# Patient Record
(demographics unavailable — no encounter records)

---

## 2017-01-20 NOTE — ERRECORD
[facility]

                                EMERGENCY RECORD



HPI TOOTHACHE (14:42 RWAG)

CHIEF COMPLAINT: Patient presents for evaluation of

      toothache. HISTORIAN: History provided by patient.

      LOCATION: Symptoms are localized, most severe to

      "whole mouth" Widespread dental decay. QUALITY:

      Described as similar to previous episodes. SEVERITY:

      Maximum severity of symptoms mild, Currently

      symptoms are mild. TIME COURSE: Patient unable to

      describe onset of symptoms, There has been no change in the

      patient's symptoms over time. ASSOCIATED WITH: No

      associated symptoms. EXACERBATED BY: Patient's condition

      exacerbated by nothing. RELIEVED BY: Nothing tried for

      relief.



ROS (14:43 RW)

CONSTITUTIONAL: Negative constitutional review of systems.

EYES: Negative eye review of systems.

ENT: Negative ears, nose, throat review of systems.

CARDIOVASCULAR: Negative cardiovascular review of systems.

RESPIRATORY: Negative respiratory review of systems.

GI: Negative gastrointestinal review of systems.

GENITOURINARY FEMALE: Negative genitourinary review of systems.

MUSCULOSKELETAL: Negative musculoskeletal review of systems.

SKIN: Negative skin review of systems.

NEUROLOGIC: Negative neurologic review of systems.

ENDOCRINE: Negative endocrine review of systems.

HEMO/LYMPHATIC: Normal hematologic/lymphatic system review.

ALLERGIC/IMMUNOLOGIC: Normal allergy/immunologic system review.

PSYCHIATRIC: Negative psychiatric review of systems.

NOTES: All systems reviewed, negative except as described above.



PAST MEDICAL HISTORY (14:15 [facility])

MEDICAL HISTORY: Flu vaccine not up to date, Tetanus

      immunization up to date, Pneumococcal vaccine not up to

      date, Past medical history includes history of

      hypertension, which has been treated, Patient is compliant,

            Past medical history includes history of hyperlipidemia, high

      cholesterol, Past medical history includes history of hypertension,

      which has been treated.

FEMALE SURGICAL HISTORY:  Surgical history of appendectomy,

      Surgical history of hernia repair, Surgical history of tubal

      ligation, LEFT ANKLE (MAY 2016), BLIAT TUBAL LIGATION, Surgical

      history of appendectomy, Surgical history of carpal tunnel surgery,

      Surgical history of hernia repair.

PSYCHIATRIC HISTORY: Psychiatric history includes,

      bipolar disorder, depression, BIPOLAR.

SOCIAL HISTORY:  Patient denies alcohol use, Patient is a

      former drug user, abused CRACK cocaine, Patient has no smoking

      history, Lives at home, with family, Patient denies alcohol use,

      Patient is a former drug user.



&a-1R&a+25V*p+0X*a6714L*c202B*c15G*c2P*p-0X&a-25V&a+1R          Name: Sofia Fuller  : 1958 F59 MedRec: [id number]

                             AcctNum: [id number]

  Prepared:  16:17 by Interface                 Page 1 of 3

                                      pMD

                        [facility]

                                EMERGENCY RECORD





KNOWN ALLERGIES

No Known Allergies (Unconfirmed)

No Known Drug Allergies



CURRENT MEDICATIONS

doxepin: 

      CAPSULE : Strength - 75 mg : ORAL

      Patient Dose: 75 mg Oral once a day. (14:11 CJ)

RisperDAL: 

      TABLET : Strength - 2 mg : ORAL

      Patient Dose: 5 mg Oral once a day. (14:11 CJ)

CeleXA: 

      TABLET : Strength - 20 mg : ORAL

      Patient Dose: Unknown. (14:12 CJEF)

lisinopril: 

      TABLET : Strength - 2.5 mg : ORAL

      Patient Dose: UNK mg Oral once a day. (14:12 CJ)



VITAL SIGNS (14:07 CJ)

VITAL SIGNS: BP: 164/90, Pulse: 118, Resp: 18, Temp: 99 (Oral),

      Pain: 10, O2 sat: 96 on Room Air, Time: 2017 14:07.



PHYSICAL EXAM (14:43 RW)

CONSTITUTIONAL: Vital signs reviewed.

HEAD: Head exam normal.

EYES: Eye exam normal.

ENT: Ear exam normal, Nose exam normal, Pharynx exam normal,

      Uvula exam normal, Tonsil exam normal, Teeth with,

      dental caries.

NECK: Neck exam normal.

RESPIRATORY CHEST: Respiratory and chest exam normal.

CARDIOVASCULAR: Cardiovascular assessment normal.

ABDOMEN FEMALE: Abdominal exam normal.

BACK: Back exam normal.

UPPER EXTREMITY: Upper extremity exam normal.

LOWER EXTREMITY: Lower extremity exam normal.

NEURO: Neuro exam normal.

SKIN: Skin exam normal.

LYMPHATIC: Lymphatic exam normal.

PSYCHIATRIC: Psychiatric exam normal.



MEDICATION ADMINISTRATION SUMMARY



      Drug Name: Norco, Dose Ordered: 1 tab(s), Route: Oral, Status: Held,

      Time: 14:39 2017, Detailed record available in Medication

      Service section.



PROBLEM LIST

  No recorded problems



&a-1R&a+25V*p+0X*w8074G*c202B*c15G*c2P*p-0X&a-25V&a+1R          Name: Sofia Fuller  : 1958 F59 MedRec: [id number]

                             AcctNum: [id number]

  Prepared:  16:17 by Interface                 Page 2 of 3

                                      pMD

                        [facility]

                                EMERGENCY RECORD





DIAGNOSIS (14:29 RW)

FINAL: PRIMARY: Dental caries.



PRESCRIPTION (14:28 RW)

Cipro tablet:  TABLET : 500 mg : ORAL : Quantity: *** 1 *** Unit:

      tab(s) Route: ORAL Schedule: every 12 hours Dispense: *** 14 ***

      Unit: tab(s)

      May substitute. Refills: *** No Refills ***.

NOTES:  No Refills.

Ultram:  TABLET : 50 mg : ORAL : Quantity: *** 2 *** Unit: tab(s)

      Route: ORAL Schedule: every 6 hours PRN Dispense: *** 20 *** Unit:

      tab(s)

      May substitute. Refills: *** No Refills ***

      POTENTIAL SEVERE INTERACTION: doxepin oral

      Override Rationale: Benefits outweigh risks, No alternative

      available, Patient tolerated similar in past.

NOTES:  No Refills.



DISPOSITION

PATIENT:  Disposition Type: Discharge, Disposition: *Discharge

      Home, Disposition Transport: Car, Condition: Improved. (14:29 [facility])

   Patient left the department. (14:41 [facility])

Key:

  CHIDI=ELY Mccann, Ida  RWAG=MD Manny, Mike





















































&a-1R&a+25V*p+0X*a5462S*c202B*c15G*c2P*p-0X&a-25V&a+1R          Name: Sofia Fuller  : 1958 F59 MedRec: [id number]

                             AcctNum: [id number]

  Prepared:  16:17 by Interface                 Page 3 of 3

                                      pMD

MTDD

## 2017-01-20 NOTE — PICIS
[facility]

                                EMERGENCY RECORD



TRIAGE ( 14:11 CJEF)

TRIAGE NOTES:  PT REPORTS ENTIRE TEETH PAIN, UPPER AND

      LOWER. PT WITH MULTIPLE AVULSED AND BROKEN TEETH THROUGH ENTIRE

      MOUTH. PT REPORTS THAT SHE HAS BEEN WITH PAIN FOR X3 DAYS AND HAS

      BEEN TRYING TO FIND A DENTIST WHO TAKES HER INSURANCE. PT REPORTS

      THAT THE PAIN ALSO EXTENDS TO THE LEFT CHEEK. PT REPORTS THAT SHE HAS

      TAKEN TRAZADONE, TYLENOL 3,AND TRAMADOL WITH NO RELIEF TO THE PAIN.

      PT WITH PHMX OF "CRACK' USE. ( 14:11 CJEF)

PATIENT: NAME: Sofia Fuller, AGE: 59, GENDER: female, :

      Sun 1958, TIME OF GREET:  14:00, PREFERRED

      LANGUAGE: English, ETHNICITY: Not  or , FALL RISK: YES,

      ECODE BILLING MAP: [facility], SSN: [id number], Zip Code:

      [address], KG WEIGHT: 68.04, PHONE: [phone], MEDICAL RECORD NUMBER:

      [id number], ACCOUNT NUMBER: [id number], PERSON ID: [id number], PCP:

      MD Gurrola Olayemi. ( 14:11 CJEF)

COMPLAINT:  TOOTH PAIN. ( 14:11 CJEF)

ADMISSION: URGENCY: 4 Non Urgent, ADMISSION SOURCE: Home,

      TRANSPORT: Walk-in, BED: TRIAGE. ( 14:11 CJEF)

ASSESSMENT: Assessment: TEETH PAIN. (14:15 CJEF)

PAIN: Patient complains of pain described as, Location

      TEETH. (14:15 CJEF)

IMMUNIZATIONS: Flu vaccine not up to date, Tetanus

      immunization up to date, Pneumococcal vaccine not up to

      date. (14:15 CJEF)

SIRS SCORING: Heart Rate 110-139 (2), Temp range

      96.8-101.1 (0), respiratory rate 12-24 (0), Mental Status altered: no

      (0), Total SIRS Score 2, Yes, Infection or Suspected

      Infection. (14:15 CJEF)

SIRS NOTIFICATION: Yes, Infection or Suspected

      Infection. (14:15 CJEF)

TRIAGE SCREENING: Patient denies suicidal ideation, Patient

      denies presence of domestic violence. (14:15 CJEF)

LMP: LMP: Menopause. (14:15 CJEF)

PROVIDERS: TRIAGE NURSE: Ida Mccann RN. (

      14:11 CJEF)

VITAL SIGNS: /90, Pulse 118, Resp 18, Temp 99, (Oral), Pain

      10, O2 Sat 96, on Room Air, Time 2017 14:07. (14:07 CJEF)

PREVIOUS VISIT ALLERGIES: No Known Drug Allergies. ( 14:11 CJEF)

  No Known Drug Allergies. (14:15 CJEF)



KNOWN ALLERGIES

No Known Allergies (Unconfirmed)

No Known Drug Allergies



CURRENT MEDICATIONS

doxepin: 

      CAPSULE : Strength - 75 mg : ORAL

      Patient Dose: 75 mg Oral once a day. (14:11 CJEF)

RisperDAL: 



&a-1R&a+25V*p+0X*q8835U*c202B*c15G*c2P*p-0X&a-25V&a+1R          Name: Sofia Fuller MAISHA  : 1958 F59 MedRec: [id number]

                             AcctNum: [id number]

  Prepared:  16:23 by Interface                 Page 1 of 5

                                      pMD

                        [facility]

                                EMERGENCY RECORD



      TABLET : Strength - 2 mg : ORAL

      Patient Dose: 5 mg Oral once a day. (14:11 CJEF)

CeleXA: 

      TABLET : Strength - 20 mg : ORAL

      Patient Dose: Unknown. (14:12 CJEF)

lisinopril: 

      TABLET : Strength - 2.5 mg : ORAL

      Patient Dose: UNK mg Oral once a day. (14:12 CJEF)



VITAL SIGNS (14:07 CJEF)

VITAL SIGNS: BP: 164/90, Pulse: 118, Resp: 18, Temp: 99 (Oral),

      Pain: 10, O2 sat: 96 on Room Air, Time: 2017 14:07.



NURSING ASSESSMENT: DENTAL (14:16 CJEF)

CONSTITUTIONAL: Complex assessment performed, Patient arrives

      ambulatory, Gait steady, History obtained from patient, Patient

      appears, uncomfortable, Patient cooperative, Patient alert,

      Oriented to person, place and time, Skin warm, Skin dry, Skin normal

      in color, Mucous membranes pink, Mucous membranes moist,

      Patient, poorly groomed, PT REPORTS ENTIRE

      TEETH PAIN, UPPER AND LOWER. PT WITH MULTIPLE AVULSED AND BROKEN

      TEETH THROUGH ENTIRE MOUTH. PT REPORTS THAT SHE HAS BEEN WITH PAIN

      FOR X3 DAYS AND HAS BEEN TRYING TO FIND A DENTIST WHO TAKES HER

      INSURANCE. PT REPORTS THAT THE PAIN ALSO EXTENDS TO THE LEFT CHEEK.

      PT REPORTS THAT SHE HAS TAKEN TRAZADONE, TYLENOL 3,AND TRAMADOL WITH

      NO RELIEF TO THE PAIN. PT WITH PHMX OF "CRACK' USE.

PAIN: aching pain, to left upper back tooth

      (teeth), to right upper back tooth (teeth), to

      upper teeth, to left lower back tooth (teeth), to

      right lower back tooth (teeth), to lower teeth,

      to the left upper jaw, to the right upper jaw,

      to the left lower jaw, right lower jaw,

      constant, on a scale 0-10 patient rates pain as

      10, ALL OVER TEETH PAIN.

DENTAL: Dental assessment findings include mouth with,

      poor dental hygiene, bad breath (halitosis),

      Teeth abnormal:, avulsed primary tooth (teeth),

      avulsed secondary tooth (teeth), broken primary tooth

      (teeth), broken secondary tooth (teeth), loose

      primary tooth (teeth), loose secondary tooth (teeth),

      missing primary tooth (teeth), missing secondary tooth

      (teeth), gums tender.

NOTES: Patient tolerated procedure well.

SAFETY: Side rails up, Cart/Stretcher in lowest position, Family

      at bedside, Call light within reach, Hospital ID band on.



NURSING PROCEDURE: DISCHARGE NOTE (14:40 [facility])

DISCHARGE: Patient discharged to home, ambulating without

      assistance, driving self, unaccompanied, Summary of Care printed/

      provided, Patient requested and was provided an electronic copy of



&a-1R&a+25V*p+0X*d8931S*c202B*c15G*c2P*p-0X&a-25V&a+1R          Name: Sofia Fuller  : 1958 F59 MedRec: [id number]

                             AcctNum: [id number]

  Prepared:  16:23 by Interface                 Page 2 of 5

                                      pMD

                        [facility]

                                EMERGENCY RECORD



      Discharge Instructions, Transition record given to patient, Discharge

      instructions given to patient, Simple or moderate discharge teaching

      performed, Prescriptions given and instructions on side effects

      given, Medication reconciliation form given, Above person(s)

      verbalized understanding of discharge instructions and follow-up

      care, Patient treated and evaluated by physician.

BELONGINGS: Belongings remain with patient.

NOTES: Patient tolerated procedure well.

SAFETY: Side rails up, Cart/Stretcher in lowest position, Family

      at bedside, Call light within reach, Hospital ID band on.



MEDICATION ADMINISTRATION SUMMARY



      Drug Name: Norco, Dose Ordered: 1 tab(s), Route: Oral, Status: Held,

      Time: 14:39 2017, Detailed record available in Medication

      Service section.



MEDICATION SERVICE (14:26 RW)

Norco:  Order: Norco (hydrocodone bitartrate/acetaminophen) -

      Dose: 1 tab(s) : Oral

      Schedule: Now

      Ordered by: Mike Bryant MD

      Entered by: Mike Bryant MD  14:26 ,

      Held by: Ida Mccann RN  14:39 Reason: Patient

      refused:NO RIDE HOME.



HPI TOOTHACHE (14:42 RWAG)

CHIEF COMPLAINT: Patient presents for evaluation of

      toothache. HISTORIAN: History provided by patient.

      LOCATION: Symptoms are localized, most severe to

      "whole mouth" Widespread dental decay. QUALITY:

      Described as similar to previous episodes. SEVERITY:

      Maximum severity of symptoms mild, Currently

      symptoms are mild. TIME COURSE: Patient unable to

      describe onset of symptoms, There has been no change in the

      patient's symptoms over time. ASSOCIATED WITH: No

      associated symptoms. EXACERBATED BY: Patient's condition

      exacerbated by nothing. RELIEVED BY: Nothing tried for

      relief.



ROS (14:43 RW)

CONSTITUTIONAL: Negative constitutional review of systems.

EYES: Negative eye review of systems.

ENT: Negative ears, nose, throat review of systems.

CARDIOVASCULAR: Negative cardiovascular review of systems.

RESPIRATORY: Negative respiratory review of systems.

GI: Negative gastrointestinal review of systems.

GENITOURINARY FEMALE: Negative genitourinary review of systems.

MUSCULOSKELETAL: Negative musculoskeletal review of systems.

SKIN: Negative skin review of systems.



&a-1R&a+25V*p+0X*k9020L*c202B*c15G*c2P*p-0X&a-25V&a+1R          Name: Sofia Fuller  : 1958 F59 MedRec: [id number]

                             AcctNum: [id number]

  Prepared:  16:23 by Interface                 Page 3 of 5

                                      pMD

                        [facility]

                                EMERGENCY RECORD



NEUROLOGIC: Negative neurologic review of systems.

ENDOCRINE: Negative endocrine review of systems.

HEMO/LYMPHATIC: Normal hematologic/lymphatic system review.

ALLERGIC/IMMUNOLOGIC: Normal allergy/immunologic system review.

PSYCHIATRIC: Negative psychiatric review of systems.

NOTES: All systems reviewed, negative except as described above.



PAST MEDICAL HISTORY (14:15 [facility])

MEDICAL HISTORY: Flu vaccine not up to date, Tetanus

      immunization up to date, Pneumococcal vaccine not up to

      date, Past medical history includes history of

      hypertension, which has been treated, Patient is compliant,

            Past medical history includes history of hyperlipidemia, high

      cholesterol, Past medical history includes history of hypertension,

      which has been treated.

FEMALE SURGICAL HISTORY:  Surgical history of appendectomy,

      Surgical history of hernia repair, Surgical history of tubal

      ligation, LEFT ANKLE (MAY 2016), BLIAT TUBAL LIGATION, Surgical

      history of appendectomy, Surgical history of carpal tunnel surgery,

      Surgical history of hernia repair.

PSYCHIATRIC HISTORY: Psychiatric history includes,

      bipolar disorder, depression, BIPOLAR.

SOCIAL HISTORY:  Patient denies alcohol use, Patient is a

      former drug user, abused CRACK cocaine, Patient has no smoking

      history, Lives at home, with family, Patient denies alcohol use,

      Patient is a former drug user.



PHYSICAL EXAM (14:43 [facility])

CONSTITUTIONAL: Vital signs reviewed.

HEAD: Head exam normal.

EYES: Eye exam normal.

ENT: Ear exam normal, Nose exam normal, Pharynx exam normal,

      Uvula exam normal, Tonsil exam normal, Teeth with,

      dental caries.

NECK: Neck exam normal.

RESPIRATORY CHEST: Respiratory and chest exam normal.

CARDIOVASCULAR: Cardiovascular assessment normal.

ABDOMEN FEMALE: Abdominal exam normal.

BACK: Back exam normal.

UPPER EXTREMITY: Upper extremity exam normal.

LOWER EXTREMITY: Lower extremity exam normal.

NEURO: Neuro exam normal.

SKIN: Skin exam normal.

LYMPHATIC: Lymphatic exam normal.

PSYCHIATRIC: Psychiatric exam normal.



EVENTS

TRANSFER:  Triage to Emergency Triage. ( 14:11

      [facility])

   Emergency Triage to Main ED -03. (14:15 [facility])



&a-1R&a+25V*p+0X*l1696V*c202B*c15G*c2P*p-0X&a-25V&a+1R          Name: Sofia Fuller  : 1958 F59 MedRec: [id number]

                             AcctNum: [id number]

  Prepared:  16:23 by Interface                 Page 4 of 5

                                      pMD

                        [facility]

                                EMERGENCY RECORD



   Removed from Emergency Main ED -03. (14:41 CJ)



PROBLEM LIST

  No recorded problems



DIAGNOSIS (14:29 RWAG)

FINAL: PRIMARY: Dental caries.



DISPOSITION

PATIENT:  Disposition Type: Discharge, Disposition: *Discharge

      Home, Disposition Transport: Car, Condition: Improved. (14:29 RWAG)

   Patient left the department. (14:41 CJ)



INSTRUCTION (14:29 RWAG)

DISCHARGE:  DENTAL PAIN.

FOLLOWUP:  MD Izabela, Cassandra, [facility], [address] [place] [address], [phone], Follow up with

      Primary Care Physician in 5 days.

SPECIAL:  Follow-up with your PCP.



PRESCRIPTION (14:28 RWAG)

Cipro tablet:  TABLET : 500 mg : ORAL : Quantity: *** 1 *** Unit:

      tab(s) Route: ORAL Schedule: every 12 hours Dispense: *** 14 ***

      Unit: tab(s)

      May substitute. Refills: *** No Refills ***.

NOTES:  No Refills.

Ultram:  TABLET : 50 mg : ORAL : Quantity: *** 2 *** Unit: tab(s)

      Route: ORAL Schedule: every 6 hours PRN Dispense: *** 20 *** Unit:

      tab(s)

      May substitute. Refills: *** No Refills ***

      POTENTIAL SEVERE INTERACTION: doxepin oral

      Override Rationale: Benefits outweigh risks, No alternative

      available, Patient tolerated similar in past.

NOTES:  No Refills.



IMAGING (14:42 [facility])

*DISCHARGE INSTRUCTIONS RECEIPT:  Image captured from scanner.

*SUPPLY CHARGE SHEET:  Image captured from scanner.



ADMIN (16:14 RW)

DIGITAL SIGNATURE:  MD Manny, Mike.

Tan:

  [facility]=ELY Mccann, Ida  RWAG=MD Manny, Mike

















&a-1R&a+25V*p+0X*t7476D*c202B*c15G*c2P*p-0X&a-25V&a+1R          Name: Sofia Fuller  : 1958 F59 MedRec: [id number]

                             AcctNum: [id number]

  Prepared:  16:23 by Interface                 Page 5 of 5

                                      pMD





                     [facility]

                        MEDICATION RECONCILIATION



    You were seen in the Emergency Department on: 

    

    KNOWN ALLERGIES

         No Known Allergies (Unconfirmed)

         No Known Drug Allergies

    

    HOME MEDICATIONS 

    

      CONTINUE AS PRESCRIBED

      CeleXA : TABLET : Strength - 20 mg : ORAL

        Continue as prescribed

        Patient had been taking: Dose unknown

    

      doxepin : CAPSULE : Strength - 75 mg : ORAL

        Continue as prescribed

        Patient had been takin mg Oral once a day.

    

      lisinopril : TABLET : Strength - 2.5 mg : ORAL

        Continue as prescribed

        Patient had been taking: UNK mg Oral once a day.

    

      RisperDAL : TABLET : Strength - 2 mg : ORAL

        Continue as prescribed

        Patient had been takin mg Oral once a day.

    

    Notes from the emergency department

      Reviewed with patient

    

    PRESCRIPTIONS (2)

    

    Printed (2)

    

      Cipro tablet : TABLET : 500 mg : ORAL

          Quantity: 1, Unit: tab(s), Route: ORAL, Schedule: every 12

          hours, Dispense: 14 Unit: tab(s)

    























&a-1R&a+25V*p+0X*d9003T*c202B*c15G*c2P*p-0X&a-25V&a+1R       Name: Sofia Fuller  : 1958 F59 MedRec: [id number]

                          AcctNum: [id number]

               Prepared:  16:23 by Interface

                                   pMD

SAV

## 2017-06-21 NOTE — CT
CT OF THE LEFT HINDFOOT

6/21/17

 

CLINICAL HISTORY: 

Prior fracture with subsequent fixation. 

 

FINDINGS:  

There is plate and screw fixation with laterally located metallic plate and several metallic screws 
traversing the anterior and posterior aspect of the calcaneus. Multifocal degenerative cyst formatio
n is present involving the postoperative calcaneus, as well as in the hindfoot and midfoot. Focal re
lative demineralization involving the lateral aspect of the talar dome is present without associated
 subchondral collapse. Lisfranc joint is maintained. Scattered soft tissue edema. There is a heterot
opic density adjacent the cuboid, well circumscribed, and chronic in appearance.

 

IMPRESSION:  

Postoperative left hindfoot. The fixated, healed fracture demonstrates near anatomic alignment.

 

There is multifocal degenerative cyst formation. No obvious hardware complication is seen within reyes
itations, as there is prominent streak artifact. 

 

POS: [facility]

## 2017-10-05 NOTE — RAD
LEFT FOOT RADIOGRAPHS THREE VIEWS:

10/5/17

 

PROVIDED CLINICAL HISTORY:  

Postop.

 

FINDINGS:  

Comparison 7/21/16.

 

Interval removal of the plate and screws previously seen. Interval placement of two large partially 
threaded cannulated lag screws traversing the calcaneus and subtalar joint regions. There is diffuse
 regional osteopenia. There is no evidence for an acute fracture or other acute osseous abnormality.
 

 

IMPRESSION:  

Interval postsurgical changes as above. 

 

POS: OFF

## 2017-11-30 NOTE — RAD
3 VIEWS LEFT FOOT:

 

Date:  11/30/17 

 

INDICATION:

Left foot pain. 

 

FINDINGS:

The two separate lag screws fixating the patient's healed calcaneal fracture is unchanged. Scattered 
osteoarthritic change is similar appearing. No acute osseous abnormality is evident. 

 

IMPRESSION: 

Stable postoperative left foot. No acute osseous abnormality. 

 

 

POS: [facility]

## 2019-03-08 NOTE — RAD
CHEST ONE VIEW

3/8/19

 

INDICATION:

Syncope. 

 

COMPARISON:  

None.

 

FINDINGS:  

The lungs are clear. Heart size is normal. No acute osseous abnormality is noted. 

 

IMPRESSION:  

No acute cardiopulmonary abnormality. 

 

POS: [facility]

## 2019-03-08 NOTE — CT
CT BRAIN NONCONTRAST:

3/8/19

 

HISTORY: 

61-year-old female status post syncope.

 

FINDINGS:

There is no midline shift or any other mass effect.  There is no evidence of acute intracranial hemor
rhage, large cortical infarct, obstructive hydrocephalus, or extraaxial fluid collection.  The calvar
ium is intact.

 

IMPRESSION: 

No acute intracranial findings.

 

 

jn []

 

POS: [facility]

## 2019-03-09 NOTE — HP
PRIMARY CARE PHYSICIAN:  Dr. Georgiana Gurrola.



CHIEF COMPLAINT:  Syncopal episode, hypotension, QT prolongation.



HISTORY OF PRESENT ILLNESS:  Ms. Fuller is a 61-year-old female with past medical

history of hypertension, hyperlipidemia, gastroesophageal reflux disease, and

cocaine use along with bipolar disorder and depression, presented to the Medicine ER

late last night after a syncopal episode at home.  For the day prior to arrival, the

patient had stated that she had several bouts of diarrhea about 4-5 the day prior

and the day of which was yesterday.  She reports walking on the hallway to her

bathroom and feeling dizzy and falling to the ground, she states that she had helped

herself up and got to the bathroom when she had noticed another episode.  She was

brought in by EMS for further evaluation, she had received 500 mL fluid bolus as her

systolic blood pressure was in the 80s.  Upon arrival, CT of head was obtained and

found to be negative with no acute findings, chest x-ray was also performed and

showed no acute cardiopulmonary abnormality.  Potassium level was found to be low at

3.0.  Therefore, she was given potassium replacement.  On her EKG, it had displayed

prolonged QT.  She had received another 2 L of IV fluid with normal saline and then

transferred to [facility] for further evaluation and

management of her symptoms.  Upon arrival, she had no fever or chills.  She had no

complaints of headache, dizziness, or blurred vision.  She had no chest pain,

shortness of breath, or palpitations.  She had some mild nausea, however, this has

improved after a dose of Reglan.  She was given 2 g of IV magnesium sulfate and

started on 1 L of half-normal saline with D5 and potassium chloride.  Looking at her

home medications, she had been taking Risperdal, Celexa, doxepin, which all could be

causing the QT prolongation.  However, a drug screen was also obtained and found to

be positive for cocaine, the patient also admitted to smoking cocaine for the last

couple of days prior to symptoms starting.  It was determined the patient be

admitted under observation and be monitored on telemetry for further QT

prolongation. 



REVIEW OF SYSTEMS:  All other systems reviewed and found to be negative unless

mentioned in the HPI. 



PAST MEDICAL HISTORY:  Hypertension, hyperlipidemia, gastroesophageal reflux disease.



PAST SURGICAL HISTORY:  Appendectomy, hernia repair, tubal ligation, left ankle

surgery, carpal tunnel surgery. 



PSYCHIATRIC HISTORY:  Significant for bipolar and depression.



SOCIAL HISTORY:  The patient admits to alcohol use roughly once a week, however,

denies any tobacco use.  She also reports using crack cocaine.  Drug screen positive

for cocaine use. 



KNOWN ALLERGIES:  Latex.



CURRENT HOME MEDICATIONS:  

1. Doxepin 75 mg oral once daily.

2. Risperdal 5 mg oral once daily.

3. Celexa 20 mg oral once daily.

4. Lisinopril 10 mg oral once daily.



PHYSICAL EXAMINATION:

VITAL SIGNS:  Blood pressure 140/79, pulse 79, respirations 20, temp 98.2 degrees

Fahrenheit, O2 saturation 97% on room air. 

GENERAL:  The patient is awake, alert, and oriented x3.  No acute distress noted. 

HEENT:  Atraumatic, normocephalic.  Pupils are round and reactive to light.

Extraocular muscles intact.  Moist mucous membranes noted with poor oral hygiene. 

NECK:  Soft and supple.  No JVD. 

CARDIOVASCULAR:  Positive S1 and S2.  Regular rate and rhythm.  No murmur

auscultated. 

RESPIRATORY:  Clear to auscultation bilaterally.  No wheezes, rales, or rhonchi. 

ABDOMEN:  Soft, nontender.  Bowel sounds present.  Nondistended. 

MUSCULOSKELETAL:  Strength 5+ bilaterally upper and lower extremities.  Moves all

extremities equally.  No edema noted. 

NEUROLOGIC:  Cranial nerves 2 through 12 grossly intact.  No focal deficits noted.

Speech intact and normal.  Gait not assessed. 

PSYCHIATRIC:  Good mood and affect.



LABORATORY DATA:  WBC 7.3, RBC 3.80, hemoglobin 12.5, platelets 270.  Sodium 142,

potassium 3.8, chloride 113, carbon dioxide 22, anion gap 11, BUN 4, creatinine

0.85, estimated GFR 82.  Troponin less than 0.010.  Urinalysis was unremarkable.

Toxicology detected tricyclics and cocaine.  Plasma alcohol less than 10. 



DIAGNOSTIC IMAGING:  CT brain noncontrast showed no acute intracranial findings.

Chest x-ray showed no acute cardiopulmonary abnormality. 



ASSESSMENT AND PLAN:  

1. QT prolongation, this is likely secondary to cocaine use and/or doxepin and/or

Celexa and/or Risperdal.  Therefore, will be stopping her home medications that are

QT prolongation causing.  She will be kept on telemetry for further monitoring.  An

IV D5 half-normal saline with KCl will be continued.  She had received 2 g of

magnesium sulfate in the ER and currently she is asymptomatic with no syncopal like

episodes. 

2. Cocaine abuse.  Further education was given for cocaine cessation and the patient

verbalized her understanding. 

3. Hypertension.  The patient will be continued on her home medication of lisinopril

and vital signs will be monitored closely. 

4. Syncope, this is likely secondary to nausea and diarrhea, which had caused

orthostatic hypotension possibly from cocaine use and/or QT prolongation.

Therefore, the patient will be monitored, labs will be rechecked, CT head was

negative.  Vital signs improved and stable at this time. 

5. Gastroesophageal reflux disease.  Continue on Pepcid twice daily.

6. Deep venous thrombosis and gastrointestinal prophylaxis.

7. Code status, full code.



DISPOSITION:  Pending further workup and clinical findings.







Job ID:  [id number]

## 2019-03-10 NOTE — PDOC.PN
- Subjective


Encounter Start Date: 03/10/19


Encounter Start Time: 17:23





Patient lying in bed, no events over night. Denies chest pain, palpitation, 

shortness of breath, abdominal pain, or other acute symptoms. She reports 

headache improved. Denies any headache, blurred vision or dizziness. Awaiting 

echo





- Objective


Resuscitation Status - Order Detail:





03/09/19 08:24


Resuscitation Status Routine 


   Co-Sign Provider: 


   Resuscitation Status: FULL: Full Resuscitation








MAR Reviewed: Yes


Vital Signs & Weight: 


 Vital Signs (12 hours)











  Temp Pulse Resp BP BP Pulse Ox


 


 03/10/19 15:53     154/83 H  


 


 03/10/19 15:15  97.9 F  59 L  20   177/75 H  93 L


 


 03/10/19 11:22  98.4 F  75  16   150/75 H  95


 


 03/10/19 08:00  98 F  55 L  16   157/69 H  100








 Weight











Admit Weight                   142 lb 11.2 oz


 


Weight                         144 lb 1.6 oz














I&O: 


 











 03/09/19 03/10/19 03/11/19





 05:59 06:59 06:59


 


Intake Total   


 


Output Total   500


 


Balance   -500











Result Diagrams: 


 03/10/19 05:31





 03/10/19 05:31


Radiology Reviewed by me: Yes


EKG Reviewed by me: Yes





Phys Exam





- Physical Examination


Constitutional: NAD


HEENT: PERRLA, moist MMs, oral pharynx no lesions


Poor oral hygiene 


Neck: no nodes, no JVD


Respiratory: no wheezing, clear to auscultation bilateral


Cardiovascular: RRR, no significant murmur


Gastrointestinal: soft, non-tender, positive bowel sounds


Musculoskeletal: no edema, pulses present


Neurological: non-focal, moves all 4 limbs


Lymphatic: no nodes


Psychiatric: normal affect, A&O x 3


Skin: no rash, cap refill <2 seconds





Dx/Plan


(1) QT prolongation


Code(s): R94.31 - ABNORMAL ELECTROCARDIOGRAM [ECG] [EKG]   Status: Acute   





(2) Cocaine abuse


Code(s): F14.10 - COCAINE ABUSE, UNCOMPLICATED   Status: Acute   





(3) HTN (hypertension)


Code(s): I10 - ESSENTIAL (PRIMARY) HYPERTENSION   Status: Acute   





(4) Syncope


Code(s): R55 - SYNCOPE AND COLLAPSE   Status: Acute   





- Plan


cont current plan of care, DVT proph w/lovenox





* No syncopal like episode since admission


* QT improved with holding home medications


* Continue lisinopril for BP control


* Await echo


* If echo returns normal, likely discharge home


* Strongly recommended cessation of cocaine use

## 2019-03-11 NOTE — EKG
Test Reason : 

Blood Pressure : ***/*** mmHG

Vent. Rate : 079 BPM     Atrial Rate : 079 BPM

   P-R Int : 132 ms          QRS Dur : 082 ms

    QT Int : 426 ms       P-R-T Axes : 073 030 026 degrees

   QTc Int : 488 ms

 

Normal sinus rhythm

ST elevation high lateral leads. Cannot r/o early acute changes, but most c/w normal variant since un
changed from multiple  prior EKG's

When compared with ECG of 08-MAR-2019 22:13, (Unconfirmed)

Nonspecific T wave abnormality has replaced inverted T waves in Inferior leads

Confirmed by DR. KARLA ONTIVEROS (3) on 3/11/2019 8:39:58 PM

 

Referred By: JOHNATHON CAGE--PAC           Confirmed By:DR. KARLA ONTIVEROS

## 2019-03-12 NOTE — DIS
DATE OF ADMISSION:  03/09/2019



DATE OF DISCHARGE:  03/11/2019



ALLERGIES:  LATEX.



CHIEF COMPLAINT:  Syncopal episode, hypotension, and QT prolongation.



FINAL DIAGNOSES:  

1. Syncopal episode, resolved, secondary to possible torsades from prolonged QT.

2. Prolonged QT in the setting of multiple psychiatric medications.

3. Bipolar disorder.

4. Cocaine abuse.

5. Hypertension.



PROCEDURES PERFORMED:  None.



LABORATORY RESULTS:  While blood cell count 4.8, hemoglobin 13.4, hematocrit 42.

Sodium 140, potassium 3.9, chloride 106, carbon dioxide 29, BUN 6, and 
creatinine

0.79.  AST 37, ALT 23, and alkaline phosphatase 83.  TSH 0.5276. 



IMAGING RESULTS:  Echocardiogram, left ventricular systolic function normal,

estimated 50% to 55%ejection fraction, mild-to-moderate MR. 



CONSULTATION:  None.



HOSPITAL COURSE:  The patient is a 61-year-old  female with past

medical history significant for hypertension, hyperlipidemia, gastroesophageal

reflux disease, bipolar disorder, depression, and recent cocaine use, who 
presented

to the ER with complaints of a syncopal episode at home.  The patient reports 
that

she has had a 4 to 5-day history of diarrhea prior to the event.  She states she

felt dizzy and fell to the ground.  She called EMS and brought her to the 
hospital

for further evaluation.  She did receive a 500 mL fluid bolus.  Systolic blood

pressure was initially in the 80s.  CT of the head was obtained and found to be

negative with no acute findings, chest x-ray was also performed and showed no 
acute

cardiopulmonary abnormality.  Potassium level was 3 and she was given potassium

replacement.  Initial EKG at the [place] ER did show prolonged QT.  She

received another 2 L of IV fluid with normal saline and was transferred to our

hospital for further level of care.  EKG on arrival to our facility showed sinus

rhythm with nonspecific T-wave abnormality and prolonged QTc of 640 
milliseconds.

She was given 2 g of IV magnesium sulfate and started on half-normal saline 
with D5

and potassium chloride.  Her home medications including Risperdal, Celexa, and

doxepin were all held secondary to QT prolongation.  The patient's symptoms have

completely resolved.  EKG this morning shows normal sinus rhythm, no acute ST or

T-wave changes and QTc of 488 milliseconds.  She has had no arrhythmia per 
telemetry

monitoring.  She has had no further nausea, vomiting, diarrhea, or syncope. 



PHYSICAL EXAMINATION:

VITAL SIGNS:  Blood pressure 136/62, O2 saturation 98% on room air.  Afebrile.

Pulse is 70. 

GENERAL:  Awake, alert, in no acute distress. 

HEENT:  Atraumatic and normocephalic.  Eye movements intact. 

NECK:  Supple, no lymphadenopathy, no carotid bruit. 

RESPIRATORY:  Regular respiratory rate and pattern, clear to auscultation

bilaterally. 

CV:  Normal S1 and S2.  No appreciable murmurs, rubs, or gallops. 

GI:  Soft, nontender, positive bowel sounds. 

PERIPHERAL VASCULAR:  No edema. 

MUSCULOSKELETAL:  No joint effusion or swelling. 

NEUROLOGIC:  Nonfocal. 

SKIN:  Normal and dry.  No skin discoloration.



DISCHARGE MEDICATIONS:  She will continue Celexa 40 mg daily.  Continue home 
dose

hydrocodone/acetaminophen 1 p.o. q.6 hours.  Continue hydroxyzine 25 mg p.o. 
b.i.d.,

lisinopril 5 mg daily.  Please note that her Risperdal and doxepin have been

discontinued in the setting of prolonged QT. 



DISCHARGE DISPOSITION:  Home.



PLAN:  We will continue Celexa and hold her other psychiatric medications at 
this

time.  She does have a followup appointment scheduled with [facility].  She has been 

counseled extensively on the cessation from cocaine and drug abuse.  She will 
also

follow up with her PCP. 







Job ID:  [id number]



[facility] CenterMAISHA

## 2020-06-09 NOTE — RAD
CERVICAL SPINE 5 VIEWS:

 

HISTORY: 

Back pain.

 

FINDINGS: 

Very pronounced hypertrophic osteophytes are seen from the cervical vertebrae.  These produce mass ef
fect on the prevertebral space and hypopharynx.  Large anterior bridging osteophytes are seen at the 
C3-4, C4-5, C5-6 levels.  

 

Loss of disk space at C3-4.  Disk space narrowing at the C6-7 disk.  

 

IMPRESSION: 

Pronounced hypertrophic spurring from the anterior cervical vertebrae with large anterior bridging os
teophytes which produce mass effect on the hypopharynx.

 

POS: AGW

## 2020-10-22 NOTE — MRI
MRI of thethoracic spine with IV contrast:

10/22/2020



COMPARISON:9/16/2020



HISTORY:Evaluate for enhancement of abnormality within the thoracic cord on prior imaging



TECHNIQUE: Multiplanar multisequence MR imaging of thethoracic spine with IV contrast



Findings:No abnormal enhancement is identified within the thoracic spine. Thoracic vertebral body hei
ght and alignment appears within normal limits. No abnormal enhancement is seen involving the

thoracic cord. Evaluation for central canal and/or neural foraminal stenosis is limited on contrast o
nly MRI. Please refer to the 9/16/2020 thoracic spine MRI for assessment of degenerative change.



IMPRESSION:No abnormal enhancement is noted within the thoracic spine, specifically the thoracic cord
.



Reported By: Gurpreet Hassan 

Electronically Signed:  10/22/2020 11:30 AM

## 2020-12-15 NOTE — RAD
AP CHEST:

 

HISTORY: 

Chest pain.

 

COMPARISON: 

3/8/2019.

 

FINDINGS: 

Lungs appear clear.  No infiltrate or vascular congestion.  Heart and mediastinum unremarkable.

 

IMPRESSION: 

No acute process.

 

POS: AGW